# Patient Record
Sex: MALE
[De-identification: names, ages, dates, MRNs, and addresses within clinical notes are randomized per-mention and may not be internally consistent; named-entity substitution may affect disease eponyms.]

---

## 2017-07-23 NOTE — XRAY REPORT
Left knee 3 views:



History: Pain and swelling.



Findings:



Minimal narrowing of the medial compartment and patellofemoral 

compartment knee joint. Sclerotic articular surface with early 

degenerative change. No fracture. No soft tissue calcification. No 

joint effusion.



Impression:



Mild degenerative changes medial and patellofemoral compartment knee 

joint. Incidentally noted deformity of the proximal diaphysis of fibula 

probably related to old injury.

## 2017-07-23 NOTE — EMERGENCY DEPARTMENT REPORT
Entered by PATRICK VIVEROS, acting as scribe for CAITLIN FELDMAN NP.





ED Lower Extremity HPI





- General


Chief Complaint: Extremity Injury, Lower


Stated Complaint: KNEE PAIN


Time Seen by Provider: 17 11:15


Source: patient


Mode of arrival: Ambulatory


Limitations: No Limitations





- History of Present Illness


Initial Comments: 





This is a 58 y/o male, nontoxic, well nourished in appearance, no acute signs 

of distress presents with chronic intermittent left knee pain x 1 week. 

Associated symptoms include swelling but he denies fever, chills, numbness, 

tingling, back pain, joint swelling, chest pain, SOB, cough, calf swelling and 

calf pain. Patient denies long car rides, recent travels, or recent hospital 

stays.  Pain is described as aching, stabbing and 8/10 on a severity scale. 

Patient stated has been walking this past month more then usual.  Denies any 

injury. No alleviating or aggravating factors. DAVID SALAZAR Complaint: other (left knee pain)


Onset/Timin


-: week(s)


Injury: Knee: Left


Place: home


Severity: moderate


Severity scale (0 -10): 8


Improves With: nothing


Worsens With: nothing


Associated Symptoms: ambulatory.  denies: snap/pop sensation, swelling, numbness

, tingling, unable to bear weight, able to partially bear weight





- Related Data


 Previous Rx's











 Medication  Instructions  Recorded  Last Taken  Type


 


Ibuprofen [Motrin 600 MG tab] 600 mg PO Q8H PRN #20 tablet 17 Unknown Rx


 


predniSONE [Deltasone] 20 mg PO BID #10 tab 17 Unknown Rx











 Allergies











Allergy/AdvReac Type Severity Reaction Status Date / Time


 


acetaminophen Allergy  Unknown Verified 16 08:42





[From Darvocet-N]     


 


propoxyphene napsylate Allergy  Unknown Verified 16 08:42





[From Darvocet-N]     














ED Review of Systems


Comment: All other systems reviewed and negative


Constitutional: denies: chills, fever


Eyes: denies: eye pain, eye discharge, vision change


ENT: denies: ear pain, throat pain


Respiratory: denies: cough, shortness of breath, wheezing


Cardiovascular: denies: chest pain, palpitations


Endocrine: no symptoms reported


Gastrointestinal: denies: abdominal pain, nausea, diarrhea


Genitourinary: denies: urgency, dysuria


Musculoskeletal: denies: back pain, joint swelling, myalgia


Skin: denies: rash, lesions


Neurological: denies: numbness


Psychiatric: denies: anxiety, depression


Hematological/Lymphatic: denies: easy bleeding, easy bruising





ED Past Medical Hx





- Past Medical History


Previous Medical History?: Yes


Hx Hypertension: Yes





- Surgical History


Past Surgical History?: No





- Social History


Smoking Status: Current Some Day Smoker


Substance Use Type: None





- Medications


Home Medications: 


 Home Medications











 Medication  Instructions  Recorded  Confirmed  Last Taken  Type


 


Ibuprofen [Motrin 600 MG tab] 600 mg PO Q8H PRN #20 tablet 17  Unknown Rx


 


predniSONE [Deltasone] 20 mg PO BID #10 tab 17  Unknown Rx














ED Physical Exam





- General


Limitations: No Limitations


General appearance: alert, in no apparent distress





- Head


Head exam: Present: atraumatic, normocephalic, normal inspection





- Eye


Eye exam: Present: normal appearance, PERRL, EOMI.  Absent: scleral icterus, 

conjunctival injection, nystagmus, periorbital swelling, periorbital tenderness


Pupils: Present: normal accommodation





- ENT


ENT exam: Present: normal exam, normal orophraynx, mucous membranes moist, TM's 

normal bilaterally, normal external ear exam





- Neck


Neck exam: Present: normal inspection, full ROM.  Absent: tenderness, 

meningismus, lymphadenopathy, thyromegaly





- Respiratory


Respiratory exam: Present: normal lung sounds bilaterally.  Absent: respiratory 

distress, wheezes, rales, rhonchi, stridor, chest wall tenderness, accessory 

muscle use, decreased breath sounds, prolonged expiratory





- Cardiovascular


Cardiovascular Exam: Present: regular rate, normal rhythm, normal heart sounds.

  Absent: bradycardia, tachycardia, irregular rhythm, systolic murmur, 

diastolic murmur, rubs, gallop





- GI/Abdominal


GI/Abdominal exam: Present: soft, normal bowel sounds.  Absent: distended, 

tenderness, guarding, rebound, rigid, diminished bowel sounds





- Rectal


Rectal exam: Present: deferred





- Extremities Exam


Extremities exam: Present: normal inspection, full ROM, normal capillary 

refill.  Absent: tenderness, pedal edema, joint swelling, calf tenderness





- Expanded Lower Extremity Exam


  ** Left


Hip exam: Present: normal inspection, full ROM.  Absent: tenderness, swelling, 

abrasion, laceration, ecchymosis, deformity, crepidus, dislocation, erythema, 

external rotation, internal rotation, shortening


Upper Leg exam: Present: normal inspection, full ROM.  Absent: tenderness, 

swelling, abrasion, laceration, ecchymosis, deformity, crepidus, dislocation, 

erythema


Knee exam: Present: normal inspection, full ROM, full knee extension.  Absent: 

tenderness, swelling, abrasion, laceration, ecchymosis, deformity, crepidus, 

dislocation, erythema, effusion, pain w/ pronation/supination, posterior draw 

sign, pain/laxity with valgus, pain/laxity with varus


Lower Leg exam: Present: normal inspection, full ROM.  Absent: tenderness, 

swelling, abrasion, laceration, ecchymosis, deformity, crepidus, dislocation, 

erythema, palpable cord, Yoselin's sign


Ankle exam: Present: normal inspection, full ROM.  Absent: tenderness, swelling

, abrasion, laceration, ecchymosis, deformity, crepidus, dislocation, erythema, 

anterior draw sign


Foot/Toe exam: Present: normal inspection, full ROM.  Absent: tenderness, 

swelling, abrasion, laceration, ecchymosis, deformity, crepidus, dislocation, 

erythema, amputation, puncture wound, foreign body, calcaneal tenderness, 

tenderness at base of 5th metatarsal, nail avulsion, subungual hematoma


Neuro vascular tendon exam: Present: no vascular compromise.  Absent: pulse 

deficit, abnormal cap refill, motor deficit, sensory deficit, tendon deficit, 

extremity cold to touch, pallor, abnormal 2-point discrimination, decreased fine

/light touch, foot drop, peroneal nerve deficit, significant pain with passive 

ROM of distal joint


Gait: Positive: observed and normal





- Back Exam


Back exam: Present: normal inspection, full ROM.  Absent: tenderness, CVA 

tenderness (R), CVA tenderness (L), muscle spasm, paraspinal tenderness, 

vertebral tenderness, rash noted





- Neurological Exam


Neurological exam: Present: alert, oriented X3, CN II-XII intact, normal gait, 

reflexes normal





- Psychiatric


Psychiatric exam: Present: normal affect, normal mood





- Skin


Skin exam: Present: warm, dry, intact, normal color.  Absent: rash





ED Course


 Vital Signs











  17





  08:36


 


Temperature 98.8 F


 


Pulse Rate 83


 


Respiratory 24





Rate 


 


Blood Pressure 133/79


 


O2 Sat by Pulse 98





Oximetry 














- Reevaluation(s)


Reevaluation #1: 





17 12:11


Patient is able to speak in full sentences with no signs of distress noted,.





ED Lower Extremity MDM





- Medical Decision Making





Ed course: This is a 57-year-old male that presents with chronic intermittent 

knee pain





1- patient was examined by myself.  X-ray of left knee has been obtained and 

dictated by Dr. Daley.  Impression; mild degenerative changes with compartment 

knee joint. Proximal diaphysis of fibula related to old injury. Patient stated 

he is aware of the fibula fracture that occurred about 20 years ago on monkey 

bars. 


2- Patient received a ibuprofen and Ace wrap to left knee


3- patient received ibuprofen as well as prednisone 5 days.


4- patient was instructed to follow up with her primary care doctor/orthopedic 

in 3-5 days or symptoms such as numbness, tingling, joint redness, calf pain, 

joint swelling, chest pain or shortness of breath return to emergency room as 

was possible


5- At time time of discharge, the patient does not seem toxic or ill in 

appearance.  No acute signs of distress noted.  Patient agrees to discharge 

treatment plan of care.  No further questions noted by the patient.


6- pt was instructed to RICE therapy





ED Disposition


Clinical Impression: 


Knee pain


Qualifiers:


 Chronicity: chronic Laterality: left Qualified Code(s): M25.562 - Pain in left 

knee; G89.29 - Other chronic pain





Disposition: DC-01 TO HOME OR SELFCARE


Is pt being admited?: No


Does the pt Need Aspirin: No


Condition: Stable


Instructions:  Arthralgia (ED), Knee Pain (ED), Prednisone (By mouth), 

Ibuprofen (By mouth), RICE Therapy (ED)


Additional Instructions: 


follow up with a primary care doctor/orthopedic in 3-5 days or symptoms such as 

numbness, tingling, joint redness, calf pain, joint swelling, chest pain or 

shortness of breath return to emergency room as was possible


Rest, elevate, ice extremity.


Prescriptions: 


Ibuprofen [Motrin 600 MG tab] 600 mg PO Q8H PRN #20 tablet


 PRN Reason: Pain


predniSONE [Deltasone] 20 mg PO BID #10 tab


Referrals: 


PRIMARY CARE,MD [Primary Care Provider] - 3-5 Days


SJ DUNLAP MD [Staff Physician] - 3-5 Days


[facility] [Outside] - 3-5 Days


[facility] [Outside] - 3-5 Days


Forms:  Work/School Release Form(ED)





This documentation as recorded by the JACKELINE cline ELIZABETH,accurately 

reflects the service I personally performed and the decisions made by me,CAITLIN FELDMAN, NAN.

## 2017-07-30 NOTE — CAT SCAN REPORT
FINAL REPORT



PROCEDURE:  CT ABDOMEN PELVIS WO CON



TECHNIQUE:  Computerized axial tomography of the abdomen and

pelvis was performed without intravenous contrast. This study is

performed without intravascular contrast material and its

sensitivity for abdominal and pelvic pathology, including

neoplasms, inflammation, abscess, free fluid, thrombosis,

arterial dissection and infarction, is reduced compared with a

contrast enhanced study. 



HISTORY:  flank pain 



COMPARISON:  No prior studies are available for comparison.



FINDINGS:  

Visualized lower thorax: No significant abnormality.



Liver: Normal size and attenuation.



Spleen: Normal size and attenuation.



Gallbladder and biliary system: Normal.



Pancreas: Normal.



Adrenals: Normal.



Kidneys: No hydronephrosis or urolithiasis.



GI tract: Appendix is visualized and does not appear inflamed.

Mild to moderate volume of stool is seen throughout the colon,

compatible with constipation. No bowel obstruction or acute

inflammation is seen.



Lymph nodes and mesentery: Normal.



Vasculature: Diffuse atherosclerotic calcification of the

abdominal aorta.



Bladder: Normal.



Reproductive organs: Prostate calcifications are noted.



Peritoneum: No free fluid.



Musculoskeletal structures: Degenerative disc changes of the

thoracolumbar spine.



Other: None.



IMPRESSION:  

Mild to moderate constipation.

## 2017-07-30 NOTE — EMERGENCY DEPARTMENT REPORT
ED Back Pain/Injury HPI





- General


Chief Complaint: Back Pain/Injury


Stated Complaint: BACK PAIN


Time Seen by Provider: 07/30/17 13:11


Source: patient


Limitations: No Limitations





- History of Present Illness


Initial Comments: 





Patient here reports that he is having lower back pain on the left side that 

started this morning.  Patient denies any injury.  Denies any urinary burning 

frequency urgency.  Denies any nausea or vomiting.  Denies any abdominal pain.  

Patient said he has had a history of arthritis and he was here last week and 

they put him on prednisone and ibuprofen.  He said his pain is 7 out of 10 and 

it comes and goes in his lower back.  Patient has a history of high blood 

pressure but he said he doesn't take any blood pressure medication.  He said he 

does not follow with  and he does not have a doctor.  Patient has a history 

of chronic pain in multiple sites from arthritis.  Pain is worst walking and 

better with rest.  Pain feels achy.  Denies any loss of bowel or bladder 

function.  Neither any numbness or tingling to extremity.


MD Complaint: back pain


-: This morning


Similar Symptoms Previously: Yes


Place: home


Radiation: none


Severity: severe


Severity scale (0 -10): 7


Quality: aching


Consistency: intermittent


Improves With: immobilization


Worsens With: walking


Context: other (patient has a history of chronic pain)


Associated Symptoms: denies: confusion, weakness, chest pain, numbness, 

difficulty walking, cough, difficulty urinating, diaphoresis, incontinence, 

fever/chills, constipation, headaches, abdominal pain, loss of appetite, malaise

, nausea/vomiting, rash, seizure, shortness of breath, syncope


Treatments Prior to Arrival: other (none)





- Related Data


 Previous Rx's











 Medication  Instructions  Recorded  Last Taken  Type


 


Ibuprofen [Motrin 600 MG tab] 600 mg PO Q8H PRN #20 tablet 07/23/17 Unknown Rx


 


predniSONE [Deltasone] 20 mg PO BID #10 tab 07/23/17 Unknown Rx











 Allergies











Allergy/AdvReac Type Severity Reaction Status Date / Time


 


acetaminophen Allergy  Unknown Verified 04/25/16 08:42





[From Darvocet-N]     


 


propoxyphene napsylate Allergy  Unknown Verified 04/25/16 08:42





[From Darvocet-N]     














ED Review of Systems


ROS: 


Stated complaint: BACK PAIN


Other details as noted in HPI





Comment: All other systems reviewed and negative


Constitutional: denies: chills, fever


Eyes: denies: vision change


Respiratory: no symptoms reported


Cardiovascular: denies: chest pain, palpitations, edema, syncope


Gastrointestinal: denies: abdominal pain, nausea, vomiting, diarrhea, 

constipation


Genitourinary: denies: urgency, dysuria, frequency, hematuria, discharge


Musculoskeletal: back pain.  denies: joint swelling, arthralgia, myalgia


Skin: denies: rash


Neurological: denies: headache, weakness, numbness, paresthesias, confusion, 

abnormal gait, vertigo





ED Past Medical Hx





- Past Medical History


Previous Medical History?: Yes


Hx Hypertension: Yes


Hx Arthritis: Yes





- Surgical History


Past Surgical History?: No





- Family History


Family history: no significant





- Social History


Smoking Status: Current Some Day Smoker


Substance Use Type: Alcohol


Other Social History: 











- Medications


Home Medications: 


 Home Medications











 Medication  Instructions  Recorded  Confirmed  Last Taken  Type


 


Ibuprofen [Motrin 600 MG tab] 600 mg PO Q8H PRN #20 tablet 07/23/17  Unknown Rx


 


predniSONE [Deltasone] 20 mg PO BID #10 tab 07/23/17  Unknown Rx














ED Physical Exam





- General


Limitations: No Limitations


General appearance: alert, in no apparent distress





- Head


Head exam: Present: atraumatic, normocephalic, normal inspection





- Eye


Eye exam: Present: normal appearance, PERRL, EOMI


Pupils: Present: normal accommodation





- ENT


ENT exam: Present: normal exam, normal orophraynx, mucous membranes moist





- Neck


Neck exam: Present: normal inspection, full ROM.  Absent: tenderness, 

meningismus, lymphadenopathy





- Expanded Neck Exam


  ** Expanded


Neck exam: Absent: tenderness, midline deformity, anterior neck swelling





- Respiratory


Respiratory exam: Present: normal lung sounds bilaterally.  Absent: respiratory 

distress, chest wall tenderness





- Cardiovascular


Cardiovascular Exam: Present: regular rate, normal rhythm, normal heart sounds





- GI/Abdominal


GI/Abdominal exam: Present: soft, normal bowel sounds.  Absent: distended, 

tenderness, guarding, rebound, rigid





- Extremities Exam


Extremities exam: Present: normal inspection, full ROM, normal capillary 

refill.  Absent: tenderness, pedal edema, joint swelling, calf tenderness





- Back Exam


Back exam: Present: normal inspection, full ROM, CVA tenderness (L).  Absent: 

tenderness, CVA tenderness (R), muscle spasm, paraspinal tenderness, vertebral 

tenderness, rash noted





- Expanded Back Exam


  ** Expanded


Back exam: Absent: saddle anesthesia


Back exam: Negative Straight Leg Raising: Left, Right





- Neurological Exam


Neurological exam: Present: alert, oriented X3, normal gait, reflexes normal.  

Absent: motor sensory deficit





- Expanded Neurological Exam


  ** Expanded


Neurological exam: Absent: innattentive, memory loss-remote event, memory loss-

recent event, ataxia, receptive aphasia, expressive aphasia, total aphasia, 

tremor, protecting the airway


Patient oriented to: Present: person, place, time


Speech: Present: fluid speech


Cranial nerves: EOM's Intact: Normal, Gag Reflex: Normal, Tongue Deviation: 

Normal, Nystagmus: Normal, Facial Sensation: Normal


Cerebellar function: Romberg: Normal


Upper motor neuron: Pronator Drift: Normal, Sensory Extinction: Normal


Sensory exam: Upper Extremity Light Touch: Normal, Upper Extremity Pin Prick: 

Normal, Upper Extremity Temperature: Normal, UE 2 Point Discrimination: Normal, 

Lower Extremity Light Touch: Normal, Lower Extremity Pin Prick: Normal, Lower 

Extremity Temperature: Normal, LE 2 Point Discrimination: Normal


Motor strength exam: RUE: 5, LUE: 5, RLE: 5, LLE: 5


DTR: bicep (R): 2+, bicep (L): 2+, tricep (R): 2+, tricep (L): 2+, knee (R): 2+

, knee (L): 2+, ankle (R): 2+, ankle (L): 2+


Best Eye Response (Krum): (4) open spontaneously


Best Motor Response (Jez): (6) obeys commands


Best Verbal Response (Jez): (5) oriented


Krum Total: 15





- Psychiatric


Psychiatric exam: Present: normal affect, normal mood





- Skin


Skin exam: Present: warm, dry, intact, normal color.  Absent: rash





ED Course


 Vital Signs











  07/30/17 07/30/17 07/30/17





  11:25 13:50 14:00


 


Temperature 97.6 F  


 


Pulse Rate 57 L 63 90


 


Respiratory 16 18 





Rate   


 


Blood Pressure 181/108  175/80


 


Blood Pressure  164/102 





[Left]   


 


O2 Sat by Pulse 98 95 





Oximetry   














  07/30/17





  15:51


 


Temperature 


 


Pulse Rate 


 


Respiratory 





Rate 


 


Blood Pressure 


 


Blood Pressure 159/98





[Left] 


 


O2 Sat by Pulse 





Oximetry 














- Reevaluation(s)


Reevaluation #1: 





07/30/17  14:26


 given Toradol 30 mg IM, Deltasone 60 mg by mouth and clonidine 0.1 mg by mouth 

and emergency room.





We will recheck pressure.  Urinalysis pending.





Reevaluation #2: 





07/30/17 17:30


Patient with positive bacteria in urine at 1+ and positive calcium oxalate.  

Negative leuks, negative white blood cell.  Negative blood.  Patient with 

positive CVA tenderness.  CT of abdomen and pelvis was done and shows no 

hydronephrosis or kidney stones.  Patient will mild to moderate constipation.


07/30/17 19:32








ED Medical Decision Making





- Lab Data








 Lab Results











  07/30/17 Range/Units





  13:57 


 


Urine Color  Yellow  (Yellow)  


 


Urine Turbidity  Clear  (Clear)  


 


Urine pH  5.0  (5.0-7.0)  


 


Ur Specific Gravity  1.031 H  (1.003-1.030)  


 


Urine Protein  <15 mg/dl  (Negative)  mg/dL


 


Urine Glucose (UA)  Neg  (Negative)  mg/dL


 


Urine Ketones  Neg  (Negative)  mg/dL


 


Urine Blood  Neg  (Negative)  


 


Urine Nitrite  Neg  (Negative)  


 


Urine Bilirubin  Neg  (Negative)  


 


Urine Urobilinogen  < 2.0  (<2.0)  mg/dL


 


Ur Leukocyte Esterase  Neg  (Negative)  


 


Urine WBC (Auto)  1.0  (0.0-6.0)  /HPF


 


Urine RBC (Auto)  9.0  (0.0-6.0)  /HPF


 


U Epithel Cells (Auto)  < 1.0  (0-13.0)  /HPF


 


Urine Bacteria (Auto)  1+  (Negative)  /HPF


 


Calcium Oxalate Crystal  Few  


 


Urine Mucus  Few  /HPF








Urine culture pending





- Radiology Data


Radiology results: report reviewed





CT abdomen and pelvis shows mild to moderate constipation.  Kidney stones or 

hydronephrosis





- Medical Decision Making


MDM:





ED course: She presented to the emergency room with left lower back pain that 

started this morning in.  He's had similar episode of back pain in the past and 

was here on 7/23 2017 and treated per patient for arthritis with Motrin and 

prednisone.  Patient did not have any injury but he did have CVA tenderness 

therefore urinalysis done which was negative for hematuria, positive for 

bacteria, positive for calcium oxalate, no white blood cell and no leukocyte 

Estrace.  Patient was given Toradol and Deltasone emergency room to manage 

pain.  He was also given clonidine for elevated blood pressure with history of 

high blood pressure.  Pressure had stabilized.  Patient reports that this pain 

was down to 3 out of 10.





Diagnostics/lab: Scan of the abdomen and pelvis without contrast revealed 

constipation and no kidney stones or hydronephrosis.


Urinalysis revealed 1+ bacteria without any white blood cells or hematuria.  

Patient did have calcium oxalate in urine.  Urine culture is pending.





Diagnosis: Constipation, left lower back pain, elevated blood pressure reading 

with history of hypertension





MEDS: Given Toradol 30 mg IM and Deltasone 60 mg by mouth for back pain which 

is PAIN IS  reduced to 3 out of 10.  He was given clonidine 0.1 mg by mouth for 

elevated blood pressure and his blood pressure is better.





I went in room to give patient his CT scan results and discharge diagnoses with 

treatment plan and patient was not in his room.  I was told by nurse that 

patient walked out if emergency room.  Patient left after being seen by 

provider.  I tried to call patient via phone to allow him to return to get his 

discharge instructions and paperwork and phone was just ringing without any 

OPPORTUNITY leave message.


Critical care attestation.: 


If time is entered above; I have spent that time in minutes in the direct care 

of this critically ill patient, excluding procedure time.








ED Disposition


Clinical Impression: 


 Acute exacerbation of chronic low back pain, Elevated systolic blood pressure 

reading with diagnosis of hypertension





Constipation


Qualifiers:


 Constipation type: unspecified constipation type Qualified Code(s): K59.00 - 

Constipation, unspecified





Disposition: Z-07 ELOPED


Is pt being admited?: No


Does the pt Need Aspirin: No


Condition: Stable


Instructions:  Back Pain (ED), Constipation (ED), Hypertension (ED)

## 2017-09-04 NOTE — CAT SCAN REPORT
CT abdomen and pelvis with contrast:



Left upper quadrant pain.



Transverse images are obtained from lower chest to the ischium 

following IV contrast administration. Coronal and sagittal 2-D 

reformatted images were.



The visualized lung bases are unremarkable. The abdominal and 

retroperitoneal organs are normal. The abdominal aorta and iliac 

vessels contain significant mural calcification but has a normal sizes 

and contours. There is mild dilatation of the distal abdominal aorta 

with a maximum diameter of 2.7 cm. The unopacified bowel and mesentery 

appear normal. The appendix is visualized. No abnormal fluid 

accumulation and no free air is noted. No obvious adenopathy. Sections 

through the pelvis demonstrate calcification of a normal sized prostate 

gland. Mild posterior spondylosis identified from L2-3 through L4-5 

levels.



Impressions:



Degenerative abdominal aortic changes. Otherwise, unremarkable exam for 

age.

## 2017-09-04 NOTE — EMERGENCY DEPARTMENT REPORT
Chief Complaint: Abdominal Pain


Stated Complaint: ABD PAIN


Time Seen by Provider: 09/04/17 10:00





- HPI


History of Present Illness: 





PT c/o LUQ abd pain since yesterday.





Pt states he drank a few beers last night. 





- ROS


Review of Systems: 





- n/v/d 





- Exam


Physical Exam: 





abd soft, luq ttp 


MSE screening note: 


Focused history and physical exam performed.


Due to findings the following was ordered:





labs 





ED Disposition for MSE


Condition: Stable

## 2017-09-04 NOTE — EMERGENCY DEPARTMENT REPORT
ED Abdominal Pain HPI





- General


Chief Complaint: Abdominal Pain


Stated Complaint: ABD PAIN


Time Seen by Provider: 09/04/17 10:00


Source: patient


Mode of arrival: Ambulatory


Limitations: No Limitations





- History of Present Illness


Initial Comments: 


Patient complains of left upper quadrant abdominal pain since yesterday.  He 

states he drank 2 beers yesterday.  He states he's been to another hospital 

within the last year similar complaints.  He did not have any imaging studies.  

He states that he thinks that we have already done more lab tests and they did 

over there.  He did not follow-up on his condition which apparently has been 

somewhat recurrent.  He denies nonsteroidals.  He denies any signs of GI 

bleeding.  He denies nausea vomiting or diarrhea.  He said no fever or chills.





MD Complaint: abdominal pain


-: Gradual, month(s)


Location: LUQ


Radiation: none


Migration to: no migration


Severity: moderate


Quality: aching


Consistency: intermittent


Improves With: nothing


Worsens With: nothing


Context: other (EtOH)


Associated Symptoms: denies other symptoms





- Related Data


 Previous Rx's











 Medication  Instructions  Recorded  Last Taken  Type


 


Ibuprofen [Motrin 600 MG tab] 600 mg PO Q8H PRN #20 tablet 07/23/17 Unknown Rx


 


predniSONE [Deltasone] 20 mg PO BID #10 tab 07/23/17 Unknown Rx


 


Lansoprazole [Prevacid] 15 mg PO BID #30 cap 09/04/17 Unknown Rx


 


traMADol [Ultram] 50 mg PO Q6HR PRN #14 tablet 09/04/17 Unknown Rx











 Allergies











Allergy/AdvReac Type Severity Reaction Status Date / Time


 


acetaminophen Allergy  Seizure Verified 09/04/17 09:56





[From Darvocet-N]     


 


cyclobenzaprine HCl Allergy  Itching Verified 09/04/17 09:56





[From Flexeril]     


 


propoxyphene napsylate Allergy  Seizure Verified 09/04/17 09:56





[From Darvocet-N]     














ED Review of Systems


ROS: 


Stated complaint: ABD PAIN


Other details as noted in HPI





Constitutional: denies: chills, fever


Eyes: denies: eye pain, eye discharge, vision change


ENT: denies: ear pain, throat pain


Respiratory: denies: cough, shortness of breath, wheezing


Cardiovascular: denies: chest pain, palpitations


Endocrine: no symptoms reported


Gastrointestinal: as per HPI, abdominal pain.  denies: nausea, vomiting, 

diarrhea, hematemesis, melena, hematochezia


Genitourinary: denies: urgency, dysuria


Musculoskeletal: denies: back pain, joint swelling, arthralgia


Skin: denies: rash, lesions


Neurological: denies: headache, weakness, paresthesias


Psychiatric: denies: anxiety, depression


Hematological/Lymphatic: denies: easy bleeding, easy bruising





ED Past Medical Hx





- Past Medical History


Hx Hypertension: Yes (NONCOMPLIANT W/ MEDS)


Hx Arthritis: Yes (KNEES)


Additional medical history: CHRONIC BACK PAIN





- Surgical History


Past Surgical History?: No





- Social History


Smoking Status: Current Every Day Smoker


Substance Use Type: Alcohol





- Medications


Home Medications: 


 Home Medications











 Medication  Instructions  Recorded  Confirmed  Last Taken  Type


 


Ibuprofen [Motrin 600 MG tab] 600 mg PO Q8H PRN #20 tablet 07/23/17  Unknown Rx


 


predniSONE [Deltasone] 20 mg PO BID #10 tab 07/23/17  Unknown Rx


 


Lansoprazole [Prevacid] 15 mg PO BID #30 cap 09/04/17  Unknown Rx


 


traMADol [Ultram] 50 mg PO Q6HR PRN #14 tablet 09/04/17  Unknown Rx














ED Physical Exam





- General


Limitations: No Limitations


General appearance: alert, in no apparent distress





- Head


Head exam: Present: atraumatic, normocephalic





- Eye


Eye exam: Present: normal appearance, PERRL, EOMI.  Absent: scleral icterus





- ENT


ENT exam: Present: mucous membranes moist





- Neck


Neck exam: Present: normal inspection





- Respiratory


Respiratory exam: Present: normal lung sounds bilaterally.  Absent: respiratory 

distress





- Cardiovascular


Cardiovascular Exam: Present: regular rate, normal rhythm.  Absent: systolic 

murmur, diastolic murmur, rubs, gallop





- GI/Abdominal


GI/Abdominal exam: Present: soft, tenderness (very mild left upper quadrant 

discomfort to palpation without peritoneal signs), normal bowel sounds.  Absent

: distended, guarding, rebound, rigid, organomegaly, mass, bruit, pulsatile mass

, hernia





- Rectal


Rectal exam: Present: deferred





- Extremities Exam


Extremities exam: Present: normal inspection





- Back Exam


Back exam: Present: normal inspection





- Neurological Exam


Neurological exam: Present: alert, oriented X3





- Psychiatric


Psychiatric exam: Present: normal affect, normal mood





- Skin


Skin exam: Present: warm, dry, intact, normal color.  Absent: rash





ED Course


 Vital Signs











  09/04/17 09/04/17 09/04/17





  09:59 12:31 14:41


 


Temperature 97.8 F 97.7 F 


 


Pulse Rate 62 52 L 45 L


 


Respiratory 17 14 17





Rate   


 


Blood Pressure 196/98  


 


Blood Pressure  183/88 172/94





[Left]   


 


O2 Sat by Pulse 99 96 97





Oximetry   














- Reevaluation(s)


Reevaluation #1: 


Patient's heart rate ranged from the high 40s to low 70s.  He had no symptoms 

associated with bradycardia.  He remained stable in the emergency department 

otherwise hemodynamically.  He is appropriate for outpatient management.  I 

think gastritis is a likely possibility.  He'll be instructed to avoid gastric 

irritants.


09/04/17 14:55





Reevaluation #2: 


Patient's repeat blood pressure was 160/87.  He was directed to recheck with 

the [facility].


09/04/17 15:22








ED Medical Decision Making





- Lab Data


Result diagrams: 


 09/04/17 10:04





 09/04/17 10:04








 Laboratory Results - last 24 hr











  09/04/17 09/04/17 09/04/17





  10:04 10:04 10:47


 


WBC  8.8  


 


RBC  5.32 H  


 


Hgb  15.9 H  


 


Hct  47.7 H  


 


MCV  90  


 


MCH  30  


 


MCHC  33  


 


RDW  13.5  


 


Plt Count  220  


 


Lymph % (Auto)  29.6  


 


Mono % (Auto)  7.5 H  


 


Eos % (Auto)  4.6 H  


 


Baso % (Auto)  0.8  


 


Lymph #  2.6  


 


Mono #  0.7  


 


Eos #  0.4  


 


Baso #  0.1  


 


Seg Neutrophils %  57.5  


 


Seg Neutrophils #  5.0  


 


Sodium   141 


 


Potassium   4.5 


 


Chloride   103.9 


 


Carbon Dioxide   25 


 


Anion Gap   17 


 


BUN   14 


 


Creatinine   0.8 


 


Estimated GFR   > 60 


 


BUN/Creatinine Ratio   17.50 


 


Glucose   116 H 


 


Calcium   8.5 


 


Total Bilirubin   0.40 


 


AST   26 


 


ALT   30 


 


Alkaline Phosphatase   75 


 


Total Protein   7.1 


 


Albumin   4.3 


 


Albumin/Globulin Ratio   1.5 


 


Lipase   105 H 


 


Urine Color    Yellow


 


Urine Turbidity    Clear


 


Urine pH    5.0


 


Ur Specific Gravity    1.020


 


Urine Protein    <15 mg/dl


 


Urine Glucose (UA)    Neg


 


Urine Ketones    Neg


 


Urine Blood    Sm


 


Urine Nitrite    Neg


 


Urine Bilirubin    Neg


 


Urine Urobilinogen    2.0


 


Ur Leukocyte Esterase    Neg


 


Urine WBC (Auto)    < 1.0


 


Urine RBC (Auto)    3.0


 


Urine Mucus    Few














- EKG Data


-: EKG Interpreted by Me


EKG shows normal: sinus rhythm, axis, intervals, QRS complexes, ST-T waves


Rate: normal





- EKG Data


Interpretation: no acute changes





- Radiology Data


Radiology results: report reviewed


interpreted by me: 


CT showed no acute process





Critical care attestation.: 


If time is entered above; I have spent that time in minutes in the direct care 

of this critically ill patient, excluding procedure time.








ED Disposition


Clinical Impression: 


Abdominal pain


Qualifiers:


 Abdominal location: left upper quadrant Qualified Code(s): R10.12 - Left upper 

quadrant pain





Gastritis


Qualifiers:


 Gastritis type: other gastritis Chronicity: unspecified Gastritis bleeding: 

without bleeding Qualified Code(s): K29.60 - Other gastritis without bleeding





Disposition: DC-01 TO HOME OR SELFCARE


Is pt being admited?: No


Does the pt Need Aspirin: No


Condition: Stable


Instructions:  Gastritis (ED), Abdominal Pain (ED)


Additional Instructions: 


Stomach irritants like alcohol aspirin Advil Aleve.  Follow-up with the primary 

care physician.  Over-the-counter Pepcid if you cannot afford the prescription 

medication.  Return any acute change or worsening.


Prescriptions: 


Lansoprazole [Prevacid] 15 mg PO BID #30 cap


traMADol [Ultram] 50 mg PO Q6HR PRN #14 tablet


 PRN Reason: Pain


Referrals: 


[facility] [Provider Group] - 3-5 Days


PRIMARY CARE,MD [Primary Care Provider] - 3-5 Days


Time of Disposition: 14:56

## 2018-02-09 NOTE — EMERGENCY DEPARTMENT REPORT
ED Abdominal Pain HPI





- General


Chief Complaint: Abdominal Pain


Stated Complaint: ABD PAIN


Time Seen by Provider: 02/09/18 14:25


Source: patient


Mode of arrival: Ambulatory


Limitations: No Limitations





- History of Present Illness


Initial Comments: 





This is a 57 y.o. male presents with LUQ pain for 5 months. Reports being told 

to f/u with primary care provider or cardiologist but he didn't have money or a 

job to follow up. He felt okay for a couple months but now the pain is worse. 

Reports pain feeling like something is squeezing and worse with elevation of 

left arm. States it feel like a hard rock in left upper quadrant when pain 

occurs. Pain is non-radiating. History of low back pain with sciatica, 

neuropathy, GERD, & HTN. He is currently off blood pressure medication because 

he could not afford to see a doctor for refills. He was on lisinopril. Denies 

chest pain or radiating pain, nausea/vomiting, fever, or congestion.


MD Complaint: abdominal pain (LUQ)


-: month(s) (5)


Location: LUQ


Radiation: none


Migration to: no migration


Severity: severe


Severity scale (0 -10): 10


Quality: stabbing, sharp


Consistency: intermittent


Improves With: nothing


Worsens With: movement, other (touch)


Associated Symptoms: denies other symptoms.  denies: nausea, vomiting, diarrhea

, fever, constipation, melena, hematuria, anorexia, syncope





- Related Data


 Previous Rx's











 Medication  Instructions  Recorded  Last Taken  Type


 


Ibuprofen [Motrin 600 MG tab] 600 mg PO Q8H PRN #20 tablet 07/23/17 Unknown Rx


 


predniSONE [Deltasone] 20 mg PO BID #10 tab 07/23/17 Unknown Rx


 


Lansoprazole [Prevacid] 15 mg PO BID #30 cap 09/04/17 Unknown Rx


 


traMADol [Ultram] 50 mg PO Q6HR PRN #14 tablet 09/04/17 Unknown Rx


 


Ondansetron [Zofran TAB] 4 mg PO Q8HR PRN #20 tablet 02/09/18 Unknown Rx


 


traMADol [Ultram] 50 mg PO Q6HR PRN #20 tablet 02/09/18 Unknown Rx











 Allergies











Allergy/AdvReac Type Severity Reaction Status Date / Time


 


acetaminophen Allergy  Seizure Verified 09/04/17 09:56





[From Darvocet-N]     


 


cyclobenzaprine HCl Allergy  Itching Verified 09/04/17 09:56





[From Flexeril]     


 


propoxyphene napsylate Allergy  Seizure Verified 09/04/17 09:56





[From Darvocet-N]     














ED Review of Systems


ROS: 


Stated complaint: ABD PAIN


Other details as noted in HPI





Constitutional: denies: chills, fever


Respiratory: denies: cough, shortness of breath, wheezing


Cardiovascular: denies: chest pain, palpitations


Gastrointestinal: abdominal pain (LUQ).  denies: nausea, vomiting, diarrhea, 

constipation


Musculoskeletal: denies: back pain, joint swelling, arthralgia


Neurological: denies: headache, weakness, paresthesias





ED Past Medical Hx





- Past Medical History


Previous Medical History?: Yes


Hx Hypertension: Yes (NONCOMPLIANT W/ MEDS)


Hx Arthritis: Yes (KNEES)


Additional medical history: CHRONIC BACK PAIN





- Surgical History


Past Surgical History?: No





- Social History


Smoking Status: Never Smoker





- Medications


Home Medications: 


 Home Medications











 Medication  Instructions  Recorded  Confirmed  Last Taken  Type


 


Ibuprofen [Motrin 600 MG tab] 600 mg PO Q8H PRN #20 tablet 07/23/17  Unknown Rx


 


predniSONE [Deltasone] 20 mg PO BID #10 tab 07/23/17  Unknown Rx


 


Lansoprazole [Prevacid] 15 mg PO BID #30 cap 09/04/17  Unknown Rx


 


traMADol [Ultram] 50 mg PO Q6HR PRN #14 tablet 09/04/17  Unknown Rx


 


Ondansetron [Zofran TAB] 4 mg PO Q8HR PRN #20 tablet 02/09/18  Unknown Rx


 


traMADol [Ultram] 50 mg PO Q6HR PRN #20 tablet 02/09/18  Unknown Rx














ED Physical Exam





- General


Limitations: No Limitations





- ENT


ENT exam: Present: mucous membranes moist, other (clear discharge, TM's swollen 

and red)





- Respiratory


Respiratory exam: Present: normal lung sounds bilaterally.  Absent: respiratory 

distress





- Cardiovascular


Cardiovascular Exam: Present: regular rate, normal rhythm.  Absent: systolic 

murmur, diastolic murmur, rubs, gallop





- GI/Abdominal


GI/Abdominal exam: Present: soft, tenderness (generalized tenderness), normal 

bowel sounds.  Absent: guarding, rebound, rigid, organomegaly, mass





- Neurological Exam


Neurological exam: Present: alert, oriented X3, normal gait





- Skin


Skin exam: Present: warm, dry, intact, normal color.  Absent: rash





ED Course


 Vital Signs











  02/09/18 02/09/18 02/09/18





  11:34 15:10 19:42


 


Temperature 98.3 F  97.9 F


 


Pulse Rate 74  57 L


 


Respiratory 16 20 18





Rate   


 


Blood Pressure 161/103  


 


Blood Pressure   147/87





[Left]   


 


O2 Sat by Pulse 94  96





Oximetry   














ED Medical Decision Making





- Lab Data


Result diagrams: 


 02/09/18 15:09





 02/09/18 15:09





- Radiology Data


Radiology results: image reviewed





IMPRESSION: 


Prominence of the common bile duct. If there is concern for 


biliary obstructive pathology followup MRCP could be obtained 





Otherwise negative study 





- Medical Decision Making





57 y.o. male that presents with LUQ pain for 5 months. Patient examined by me. 

Slightly distressed. CT of abd obtained and read by radiologist. Need followup 

with MRCP prominence common bile duct. Referral to GI. Ordered UA, BMP, CBC, 

hepatic panel, lipase, amylase. Received morphine 4 mg IV twice and zofran in 

ER. Blood pressure elevated. Given hydralazine 20 mg IV once. Restart 

lisinopril 10 mg po daily. Follow up with [facility]. Discharged 

home. Discussed plan with patient and agreed with plan. 





Critical care attestation.: 


If time is entered above; I have spent that time in minutes in the direct care 

of this critically ill patient, excluding procedure time.








ED Disposition


Clinical Impression: 


Hypertension


Qualifiers:


 Hypertension type: essential hypertension Qualified Code(s): I10 - Essential (

primary) hypertension





Abdominal pain


Qualifiers:


 Abdominal location: generalized Qualified Code(s): R10.84 - Generalized 

abdominal pain





Disposition: DC-01 TO HOME OR SELFCARE


Is pt being admited?: No


Does the pt Need Aspirin: No


Condition: Stable


Instructions:  Abdominal Pain (ED), Hypertension (ED)


Additional Instructions: 


Follow up with Gastroenterology in 24-72 hours.





If you do not have insurance f/u with [facility] or [facility].








Prescriptions: 


Ondansetron [Zofran TAB] 4 mg PO Q8HR PRN #20 tablet


 PRN Reason: Nausea


traMADol [Ultram] 50 mg PO Q6HR PRN #20 tablet


 PRN Reason: Pain


Referrals: 


[facility] [Provider Group] - 3-5 Days


[facility] [Outside] - 3-5 Days


[facility] [Outside] - 3-5 Days


Time of Disposition: 19:22


Print Language: ENGLISH

## 2018-02-09 NOTE — XRAY REPORT
FINAL REPORT



EXAM:  XR CHEST 1V AP



HISTORY:  Abdominal Pain, WHEEZING,SOB. 



TECHNIQUE:  upright single view chest



PRIORS:  None.



FINDINGS:  

Cardiac and mediastinal contours are unremarkable.  No focal

pulmonary infiltrate is identified.  No pleural fluid collection

seen. Pulmonary vasculature is unremarkable. 



IMPRESSION:  

Negative single-view chest

## 2018-02-09 NOTE — CAT SCAN REPORT
FINAL REPORT



EXAM:  CT ABDOMEN PELVIS W CON



HISTORY:  Abdominal Pain 



TECHNIQUE:  CT abdomen and pelvis with  intravenous contrast 



PRIORS:  None.



FINDINGS:  

No acute abnormality identified in the lung bases. 



No focal abnormality identified within the liver parenchyma.

There prominence of the common bile duct measuring 0.9

centimeters.. The spleen demonstrates normal size and

attenuation. 



No pancreatic abnormalities seen. 



The kidneys demonstrate symmetric contrast enhancement.  No

evidence of hydronephrosis. 



The adrenal glands are unremarkable 



Abdominal aorta is normal in caliber. 



No pathologically enlarged lymph nodes are identified. No signs

of free fluid or free air 



No evidence of small bowel dilatation. 



Colon is nondistended. No pericolonic inflammatory change.



Urinary bladder is unremarkable. 



IMPRESSION:  

Prominence of the common bile duct. If there is concern for

biliary obstructive pathology followup MRCP could be obtained 



Otherwise negative study

## 2018-02-09 NOTE — EMERGENCY DEPARTMENT REPORT
Chief Complaint: Abdominal Pain


Stated Complaint: ABD PAIN


Time Seen by Provider: 02/09/18 14:25





- HPI


History of Present Illness: 





reports that he has had LUQ pain, for 5 months, says it "hurts real bad." 

Squeezing sensation in the LUQ. denies nvd. PMHx: Sciatica, neuropathy, back 

pain, deviated septum, GERD, HTN but doesn't take medicine for it. States he can

't afford to go to a doctor. Used to take lisinopril. I reviewed his previous 

Ct and it does show a abdominal aortic dilatation of 2.7cm in 9/2017





- ROS


Review of Systems: 





Otherwise negative








- Exam


Vital Signs: 


 Vital Signs











  02/09/18





  11:34


 


Temperature 98.3 F


 


Pulse Rate 74


 


Respiratory 16





Rate 


 


Blood Pressure 161/103


 


O2 Sat by Pulse 94





Oximetry 











Physical Exam: 





mild distress, does have wheezing, generalized tenderness to the abdomen, no 

rebound, no guarding. 


MSE screening note: 


Focused history and physical exam performed.


Due to findings the following was ordered:





He will need a work up today. His O2 sats are 94%, he is wheezing, and has abd 

pain that seems significant. 





ED Disposition for MSE


Condition: Stable


Referrals: 


PRIMARY CARE,MD [Primary Care Provider] - 3-5 Days

## 2018-05-27 NOTE — EMERGENCY DEPARTMENT REPORT
ED Lower Extremity HPI





- General


Chief Complaint: Extremity Injury, Lower


Stated Complaint: KNEE PAIN


Time Seen by Provider: 05/27/18 08:00


Source: patient


Mode of arrival: Ambulatory


Limitations: No Limitations





- History of Present Illness


Initial Comments: 





This is a 58-year-old male nontoxic, well nourished in appearance, no acute 

signs of distress presents to the ED with c/o of acute on chronic bilateral 

knee pain.  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 

in pain.  Patient deneis any trauma to the knees. Patient denies any joint 

redness, joint swelling, fever, chills, nausea, vomiting, chest pain or 

shortness breath.  Patient denies abnormal or decreased gait.  Patient stated 

allergies to acetaminophen, aspirin, Flexeril, and Propxyphine.  Patient stated 

does take OTC Motrin with no allergies. 


MD Complaint: knee injury


-: week(s) (1)


Injury: Knee: Right, Left


Place: work


Severity: mild


Severity scale (0 -10): 8


Improves With: immobilization


Worsens With: movement, palpation


Associated Symptoms: ambulatory.  denies: snap/pop sensation, swelling, numbness

, tingling, unable to bear weight, able to partially bear weight





- Related Data


 Previous Rx's











 Medication  Instructions  Recorded  Last Taken  Type


 


Ibuprofen [Motrin 600 MG tab] 600 mg PO Q8H PRN #20 tablet 07/23/17 Unknown Rx


 


predniSONE [Deltasone] 20 mg PO BID #10 tab 07/23/17 Unknown Rx


 


Lansoprazole [Prevacid] 15 mg PO BID #30 cap 09/04/17 Unknown Rx


 


traMADol [Ultram] 50 mg PO Q6HR PRN #14 tablet 09/04/17 Unknown Rx


 


Ondansetron [Zofran TAB] 4 mg PO Q8HR PRN #20 tablet 02/09/18 Unknown Rx


 


traMADol [Ultram] 50 mg PO Q6HR PRN #20 tablet 02/09/18 Unknown Rx


 


Ibuprofen [Motrin] 600 mg PO Q8H PRN #30 tablet 05/27/18 Unknown Rx











 Allergies











Allergy/AdvReac Type Severity Reaction Status Date / Time


 


acetaminophen Allergy  Seizure Verified 09/04/17 09:56





[From Darvocet-N]     


 


aspirin Allergy  Itching Verified 05/27/18 07:47


 


cyclobenzaprine HCl Allergy  Itching Verified 09/04/17 09:56





[From Flexeril]     


 


propoxyphene napsylate Allergy  Seizure Verified 09/04/17 09:56





[From Darvocet-N]     














ED Review of Systems


ROS: 


Stated complaint: KNEE PAIN


Other details as noted in HPI





Constitutional: denies: chills, fever


Eyes: denies: eye pain, eye discharge, vision change


ENT: denies: ear pain, throat pain


Respiratory: denies: cough, shortness of breath, wheezing


Cardiovascular: denies: chest pain, palpitations


Endocrine: no symptoms reported


Gastrointestinal: denies: abdominal pain, nausea, diarrhea


Genitourinary: denies: urgency, dysuria


Musculoskeletal: arthralgia.  denies: back pain, joint swelling


Skin: denies: rash, lesions


Neurological: denies: headache, weakness, paresthesias


Psychiatric: denies: anxiety, depression


Hematological/Lymphatic: denies: easy bleeding, easy bruising





ED Past Medical Hx





- Past Medical History


Hx Hypertension: Yes (NONCOMPLIANT W/ MEDS)


Hx Arthritis: Yes (KNEES)


Hx COPD: Yes


Additional medical history: CHRONIC BACK PAIN, Abdominal aneurysm





- Social History


Smoking Status: Never Smoker


Substance Use Type: Alcohol





- Medications


Home Medications: 


 Home Medications











 Medication  Instructions  Recorded  Confirmed  Last Taken  Type


 


Ibuprofen [Motrin 600 MG tab] 600 mg PO Q8H PRN #20 tablet 07/23/17  Unknown Rx


 


predniSONE [Deltasone] 20 mg PO BID #10 tab 07/23/17  Unknown Rx


 


Lansoprazole [Prevacid] 15 mg PO BID #30 cap 09/04/17  Unknown Rx


 


traMADol [Ultram] 50 mg PO Q6HR PRN #14 tablet 09/04/17  Unknown Rx


 


Ondansetron [Zofran TAB] 4 mg PO Q8HR PRN #20 tablet 02/09/18  Unknown Rx


 


traMADol [Ultram] 50 mg PO Q6HR PRN #20 tablet 02/09/18  Unknown Rx


 


Ibuprofen [Motrin] 600 mg PO Q8H PRN #30 tablet 05/27/18  Unknown Rx














ED Physical Exam





- General


Limitations: No Limitations


General appearance: alert, in no apparent distress





- Head


Head exam: Present: atraumatic, normocephalic





- Eye


Eye exam: Present: normal appearance


Pupils: Present: normal accommodation





- ENT


ENT exam: Present: normal exam, mucous membranes moist





- Neck


Neck exam: Present: normal inspection, full ROM.  Absent: tenderness, 

meningismus, lymphadenopathy





- Respiratory


Respiratory exam: Present: normal lung sounds bilaterally.  Absent: respiratory 

distress, wheezes, rales, rhonchi, stridor





- Cardiovascular


Cardiovascular Exam: Present: regular rate, normal rhythm, normal heart sounds.

  Absent: irregular rhythm, systolic murmur, diastolic murmur, rubs, gallop





- GI/Abdominal


GI/Abdominal exam: Present: soft, normal bowel sounds





- Rectal


Rectal exam: Present: deferred





- Extremities Exam


Extremities exam: Present: normal inspection, full ROM, tenderness, normal 

capillary refill.  Absent: pedal edema, joint swelling, calf tenderness





- Expanded Lower Extremity Exam


  ** Left


Hip exam: Present: normal inspection (bilateral exam), full ROM


Upper Leg exam: Present: normal inspection (bilateral exam), full ROM


Knee exam: Present: normal inspection (bilateral exam), full ROM, tenderness, 

full knee extension.  Absent: swelling, laceration, ecchymosis, deformity, 

crepidus, dislocation, erythema, pain w/ pronation/supination, posterior draw 

sign, pain/laxity with valgus, pain/laxity with varus


Lower Leg exam: Present: normal inspection (bilateral exam), full ROM.  Absent: 

tenderness, swelling, abrasion, laceration, ecchymosis, deformity, crepidus, 

dislocation, erythema, palpable cord, Yoselin's sign


Ankle exam: Present: normal inspection (bilateral exam), full ROM.  Absent: 

tenderness, swelling


Foot/Toe exam: Present: normal inspection (bilateral exam), full ROM


Neuro vascular tendon exam: Present: no vascular compromise (bilateral exam).  

Absent: pulse deficit, abnormal cap refill, motor deficit, sensory deficit, 

tendon deficit, extremity cold to touch, pallor, abnormal 2-point discrimination

, decreased fine/light touch, foot drop, peroneal nerve deficit, significant 

pain with passive ROM of distal joint


Gait: Positive: observed and normal (bilateral exam)





- Back Exam


Back exam: Present: normal inspection, full ROM





- Neurological Exam


Neurological exam: Present: alert, oriented X3, normal gait





- Psychiatric


Psychiatric exam: Present: normal affect, normal mood





- Skin


Skin exam: Present: warm, dry, intact, normal color.  Absent: rash





ED Course





 Vital Signs











  05/27/18





  07:47


 


Temperature 98.0 F


 


Pulse Rate 60


 


Respiratory 18





Rate 


 


Blood Pressure 137/91


 


O2 Sat by Pulse 98





Oximetry 














- Reevaluation(s)


Reevaluation #1: 





05/27/18 08:25


Patient is speaking in full sentences with no signs of distress noted.





ED Lower Extremity MDM





- Medical Decision Making





This is a 58-year-old male that presents with bilateral knee strain.  Patient 

is stable and was examined by me.  I referred patient to an orthopedic doctor 

for further evaluation for possible MRI.  X-ray has been obtained and dictated 

by the radiologist.  Patient is notified of the x-ray report with noted by the 

patient.  Patient does have normal gait with no tenderness and no joint 

swelling.  No ecchymosis. no joint redness or swelling. Not warm to touch. No 

signs of cellulites present.  Patient was instructed to RICE therapy.  Patient 

received Motrin for pain which he stated to me that he does not have any 

allergies to Motrin and has been taking 800 mg.  Patient is discharged with 

Motrin. At time of discharge, the patient does not seem toxic or ill in 

appearance.  No acute signs of distress noted.  Patient agrees to discharge 

treatment plan of care.  No further questions noted by the patient.


Critical care attestation.: 


If time is entered above; I have spent that time in minutes in the direct care 

of this critically ill patient, excluding procedure time.








ED Disposition


Clinical Impression: 


 Strain of knee, bilateral





Disposition: DC-01 TO HOME OR SELFCARE


Is pt being admited?: No


Does the pt Need Aspirin: No


Condition: Stable


Instructions:  Knee Pain (ED), RICE Therapy (ED), Ibuprofen (By mouth)


Additional Instructions: 


Follow-up with a orthopedic doctor in 3-5 days or if symptoms worsen and 

continue return to emergency room as soon as possible. 


Prescriptions: 


Ibuprofen [Motrin] 600 mg PO Q8H PRN #30 tablet


 PRN Reason: Pain


Referrals: 


PRIMARY CARE,MD [Primary Care Provider] - 3-5 Days


SJ DUNLAP MD [Staff Physician] - 3-5 Days


[facility] [Outside] - 3-5 Days


RAYA WALKER MD [Staff Physician] - 3-5 Days

## 2018-05-30 NOTE — XRAY REPORT
BILATERAL KNEES, 3 VIEWS



History: Bilateral knee pain.



Findings: Normal bone mineralization. Minimal osteoarthritic changes 

are identified in both knees which appear appropriate for this persons 

age. No evidence for is advanced joint pathology, fracture, bone lesion 

or joint effusion.



Impression: Minimal osteoarthritis.

## 2018-08-19 NOTE — EMERGENCY DEPARTMENT REPORT
- General


Chief Complaint: Upper Respiratory Infection


Stated Complaint: CHEST PAIN SOB


Time Seen by Provider: 08/19/18 14:36


Source: patient


Mode of arrival: Ambulatory


Limitations: No Limitations





- History of Present Illness


Initial Comments: 





57 yo male with hx of COPD.  Unable to afford home medications including 

albuterol.  Presents with shortness of breath, wheezing, Pain with cough.  +

productive cough


MD Complaint: cough


-: days(s) (5)


Severity: mild, moderate, severe


Consistency: constant


Improves With: nothing


Associated Symptoms: fever, chills, shortness of breath





- Related Data


 Previous Rx's











 Medication  Instructions  Recorded  Last Taken  Type


 


Ibuprofen [Motrin 600 MG tab] 600 mg PO Q8H PRN #20 tablet 07/23/17 Unknown Rx


 


predniSONE [Deltasone] 20 mg PO BID #10 tab 07/23/17 Unknown Rx


 


Lansoprazole [Prevacid] 15 mg PO BID #30 cap 09/04/17 Unknown Rx


 


traMADol [Ultram] 50 mg PO Q6HR PRN #14 tablet 09/04/17 Unknown Rx


 


Ondansetron [Zofran TAB] 4 mg PO Q8HR PRN #20 tablet 02/09/18 Unknown Rx


 


traMADol [Ultram] 50 mg PO Q6HR PRN #20 tablet 02/09/18 Unknown Rx


 


Ibuprofen [Motrin] 600 mg PO Q8H PRN #30 tablet 05/27/18 Unknown Rx


 


Cephalexin [Keflex] 500 mg PO QID 7 Days #28 capsule 08/19/18 Unknown Rx


 


predniSONE [Deltasone] 60 mg PO QDAY 5 Days #15 tab 08/19/18 Unknown Rx











 Allergies











Allergy/AdvReac Type Severity Reaction Status Date / Time


 


acetaminophen Allergy  Seizure Verified 08/19/18 13:07





[From Darvocet-N]     


 


aspirin Allergy  Itching Verified 08/19/18 13:07


 


cyclobenzaprine HCl Allergy  Itching Verified 08/19/18 13:07





[From Flexeril]     


 


propoxyphene napsylate Allergy  Seizure Verified 08/19/18 13:07





[From Darvocet-N]     














ED Review of Systems


ROS: 


Stated complaint: CHEST PAIN SOB


Other details as noted in HPI





Comment: All other systems reviewed and negative


Constitutional: denies: fever, malaise


Respiratory: cough, SOB at rest, wheezing


Cardiovascular: chest pain





ED Past Medical Hx





- Past Medical History


Hx Hypertension: Yes


Hx Arthritis: Yes (KNEES)


Hx COPD: Yes


Additional medical history: CHRONIC BACK PAIN, Abdominal aneurysm





- Social History


Smoking Status: Never Smoker


Substance Use Type: None





- Medications


Home Medications: 


 Home Medications











 Medication  Instructions  Recorded  Confirmed  Last Taken  Type


 


Ibuprofen [Motrin 600 MG tab] 600 mg PO Q8H PRN #20 tablet 07/23/17  Unknown Rx


 


predniSONE [Deltasone] 20 mg PO BID #10 tab 07/23/17  Unknown Rx


 


Lansoprazole [Prevacid] 15 mg PO BID #30 cap 09/04/17  Unknown Rx


 


traMADol [Ultram] 50 mg PO Q6HR PRN #14 tablet 09/04/17  Unknown Rx


 


Ondansetron [Zofran TAB] 4 mg PO Q8HR PRN #20 tablet 02/09/18  Unknown Rx


 


traMADol [Ultram] 50 mg PO Q6HR PRN #20 tablet 02/09/18  Unknown Rx


 


Ibuprofen [Motrin] 600 mg PO Q8H PRN #30 tablet 05/27/18  Unknown Rx


 


Cephalexin [Keflex] 500 mg PO QID 7 Days #28 capsule 08/19/18  Unknown Rx


 


predniSONE [Deltasone] 60 mg PO QDAY 5 Days #15 tab 08/19/18  Unknown Rx














ED Physical Exam





- General


Limitations: No Limitations


General appearance: alert, in no apparent distress, other (speaking full word 

sentences, appears comfortable)





- Head


Head exam: Present: atraumatic, normocephalic





- Eye


Eye exam: Present: normal appearance





- ENT


ENT exam: Present: mucous membranes moist





- Neck


Neck exam: Present: normal inspection.  Absent: tenderness, meningismus





- Respiratory


Respiratory exam: Present: wheezes, prolonged expiratory.  Absent: rales, 

rhonchi, stridor, chest wall tenderness, accessory muscle use, decreased breath 

sounds





- Cardiovascular


Cardiovascular Exam: Present: regular rate, normal rhythm.  Absent: systolic 

murmur, diastolic murmur, rubs, gallop





- GI/Abdominal


GI/Abdominal exam: Present: soft, normal bowel sounds.  Absent: distended, 

tenderness, guarding, rebound





- Rectal


Rectal exam: Present: deferred





- Extremities Exam


Extremities exam: Present: normal inspection





- Back Exam


Back exam: Present: normal inspection





- Neurological Exam


Neurological exam: Present: alert, oriented X3





- Psychiatric


Psychiatric exam: Present: normal affect, normal mood





- Skin


Skin exam: Present: warm, dry, intact, normal color.  Absent: rash





ED Course





 Vital Signs











  08/19/18





  13:07


 


Temperature 97.9 F


 


Pulse Rate 67


 


Respiratory 18





Rate 


 


Blood Pressure 143/83


 


O2 Sat by Pulse 97





Oximetry 














ED Medical Decision Making





- EKG Data


EKG shows normal: sinus rhythm, axis, intervals, QRS complexes


Rate: normal





- EKG Data


Interpretation: no acute changes, normal EKG





- Medical Decision Making





acute mild COPD exacerbation rx: prednisone, cephalexin


Critical care attestation.: 


If time is entered above; I have spent that time in minutes in the direct care 

of this critically ill patient, excluding procedure time.








ED Disposition


Clinical Impression: 


 Acute exacerbation of chronic obstructive pulmonary disease (COPD)





Disposition: DC-01 TO HOME OR SELFCARE


Is pt being admited?: No


Does the pt Need Aspirin: No


Condition: Stable


Instructions:  Chronic Obstructive Pulmonary Disease (ED)


Prescriptions: 


Cephalexin [Keflex] 500 mg PO QID 7 Days #28 capsule


predniSONE [Deltasone] 60 mg PO QDAY 5 Days #15 tab


Referrals: 


PRIMARY CARE,MD [Primary Care Provider] - 3-5 Days


Time of Disposition: 15:00

## 2018-09-03 NOTE — EMERGENCY DEPARTMENT REPORT
ED General Adult HPI





- General


Chief complaint: Abdominal Pain


Stated complaint: STOMACH PAIN


Time Seen by Provider: 09/03/18 07:16


Source: patient


Mode of arrival: Ambulatory


Limitations: No Limitations





- History of Present Illness


Initial comments: 


This is a 58-year-old male that reports to the emergency department with 

recurrent complaints.  He states he has no primary care doctor.  COPD.  It 

appears that he has been previously treated with Prevacid analgesia for this 

symptom complex before.  He said several negative CTs of the abdomen and 

pelvis.  The area in question appears to be the left costal area which he 

repeatedly rubs.  He does not describe his pain as chest pain.  There is no arm 

or neck or back radiation.  Is apparently tender to palpation.  He denies 

vomiting or nausea.  He's had no fever or chills.  He is not coughing.  The 

symptoms are really quite recurrent.  He states he has had some wheezing.  Seen 

this patient in the past for back and abdominal pain.  He does appear to have 

chronic pain.


-: days(s)


Location: chest, abdomen


Radiation: non-radiation


Quality: aching


Consistency: constant


Improves with: none


Worsens with: other (movement)


Associated Symptoms: denies other symptoms


Treatments Prior to Arrival: none





- Related Data


 Previous Rx's











 Medication  Instructions  Recorded  Last Taken  Type


 


Ibuprofen [Motrin 600 MG tab] 600 mg PO Q8H PRN #20 tablet 07/23/17 Unknown Rx


 


predniSONE [Deltasone] 20 mg PO BID #10 tab 07/23/17 Unknown Rx


 


Ondansetron [Zofran TAB] 4 mg PO Q8HR PRN #20 tablet 02/09/18 Unknown Rx


 


traMADol [Ultram] 50 mg PO Q6HR PRN #20 tablet 02/09/18 Unknown Rx


 


Ibuprofen [Motrin] 600 mg PO Q8H PRN #30 tablet 05/27/18 Unknown Rx


 


Cephalexin [Keflex] 500 mg PO QID 7 Days #28 capsule 08/19/18 Unknown Rx


 


Albuterol Sulfate [Ventolin HFA] 2 puff IH Q4H PRN #1 hfa.aer.ad 09/03/18 

Unknown Rx


 


Lansoprazole [Prevacid] 15 mg PO BID #30 cap 09/03/18 Unknown Rx


 


predniSONE [Deltasone] 60 mg PO QDAY 5 Days #15 tab 09/03/18 Unknown Rx


 


traMADol [Ultram 50 MG tab] 50 mg PO Q6HR PRN #14 tablet 09/03/18 Unknown Rx











 Allergies











Allergy/AdvReac Type Severity Reaction Status Date / Time


 


acetaminophen Allergy  Seizure Verified 08/19/18 13:07





[From Darvocet-N]     


 


aspirin Allergy  Itching Verified 08/19/18 13:07


 


cyclobenzaprine HCl Allergy  Itching Verified 08/19/18 13:07





[From Flexeril]     


 


propoxyphene napsylate Allergy  Seizure Verified 08/19/18 13:07





[From Darvocet-N]     














ED Review of Systems


ROS: 


Stated complaint: STOMACH PAIN


Other details as noted in HPI





Constitutional: denies: chills, fever


Eyes: denies: eye pain, eye discharge, vision change


ENT: denies: ear pain, throat pain


Respiratory: denies: cough, shortness of breath, wheezing


Cardiovascular: denies: chest pain, palpitations


Endocrine: no symptoms reported


Gastrointestinal: as per HPI, abdominal pain.  denies: nausea, diarrhea


Genitourinary: denies: urgency, dysuria


Musculoskeletal: denies: back pain, joint swelling, arthralgia


Skin: denies: rash, lesions


Neurological: denies: headache, weakness, paresthesias


Psychiatric: denies: anxiety, depression


Hematological/Lymphatic: denies: easy bleeding, easy bruising





ED Past Medical Hx





- Past Medical History


Hx Hypertension: Yes


Hx Arthritis: Yes (KNEES)


Hx COPD: Yes


Additional medical history: CHRONIC BACK PAIN, Abdominal aneurysm, hep C+





- Social History


Smoking Status: Former Smoker


Substance Use Type: Alcohol





- Medications


Home Medications: 


 Home Medications











 Medication  Instructions  Recorded  Confirmed  Last Taken  Type


 


Ibuprofen [Motrin 600 MG tab] 600 mg PO Q8H PRN #20 tablet 07/23/17  Unknown Rx


 


predniSONE [Deltasone] 20 mg PO BID #10 tab 07/23/17  Unknown Rx


 


Ondansetron [Zofran TAB] 4 mg PO Q8HR PRN #20 tablet 02/09/18  Unknown Rx


 


traMADol [Ultram] 50 mg PO Q6HR PRN #20 tablet 02/09/18  Unknown Rx


 


Ibuprofen [Motrin] 600 mg PO Q8H PRN #30 tablet 05/27/18  Unknown Rx


 


Cephalexin [Keflex] 500 mg PO QID 7 Days #28 capsule 08/19/18  Unknown Rx


 


Albuterol Sulfate [Ventolin HFA] 2 puff IH Q4H PRN #1 hfa.aer.ad 09/03/18  

Unknown Rx


 


Lansoprazole [Prevacid] 15 mg PO BID #30 cap 09/03/18  Unknown Rx


 


predniSONE [Deltasone] 60 mg PO QDAY 5 Days #15 tab 09/03/18  Unknown Rx


 


traMADol [Ultram 50 MG tab] 50 mg PO Q6HR PRN #14 tablet 09/03/18  Unknown Rx














ED Physical Exam





- General


Limitations: No Limitations


General appearance: alert, in no apparent distress





- Head


Head exam: Present: atraumatic, normocephalic





- Eye


Eye exam: Present: normal appearance, PERRL, EOMI.  Absent: scleral icterus





- ENT


ENT exam: Present: mucous membranes moist





- Neck


Neck exam: Present: normal inspection.  Absent: tenderness, meningismus





- Respiratory


Respiratory exam: Present: normal lung sounds bilaterally, chest wall 

tenderness.  Absent: respiratory distress





- Cardiovascular


Cardiovascular Exam: Present: regular rate, normal rhythm.  Absent: systolic 

murmur, diastolic murmur, rubs, gallop





- GI/Abdominal


GI/Abdominal exam: Present: soft, normal bowel sounds.  Absent: distended, 

tenderness, guarding, rebound, rigid





- Rectal


Rectal exam: Present: deferred





- Extremities Exam


Extremities exam: Present: normal inspection, normal capillary refill.  Absent: 

tenderness, pedal edema, joint swelling, calf tenderness





- Back Exam


Back exam: Present: normal inspection.  Absent: CVA tenderness (R), CVA 

tenderness (L), muscle spasm, paraspinal tenderness, vertebral tenderness





- Neurological Exam


Neurological exam: Present: alert, oriented X3, CN II-XII intact.  Absent: 

motor sensory deficit





- Psychiatric


Psychiatric exam: Present: normal affect, normal mood





- Skin


Skin exam: Present: warm, dry, intact, normal color.  Absent: rash





ED Course


 Vital Signs











  09/03/18 09/03/18 09/03/18





  06:13 07:08 08:07


 


Temperature 97.9 F 97.9 F 97.7 F


 


Pulse Rate 66 70 59 L


 


Respiratory 18 24 24





Rate   


 


Blood Pressure 148/124 148/120 146/106


 


O2 Sat by Pulse 97 97 100





Oximetry   














  09/03/18 09/03/18 09/03/18





  09:48 10:01 10:15


 


Temperature   


 


Pulse Rate 62 74 54 L


 


Respiratory 21 11 L 9 L





Rate   


 


Blood Pressure  162/97 141/111


 


O2 Sat by Pulse  97 98





Oximetry   














  09/03/18 09/03/18 09/03/18





  10:31 10:45 11:01


 


Temperature   


 


Pulse Rate 61 67 61


 


Respiratory 11 L 7 L 22





Rate   


 


Blood Pressure 156/100 137/69 152/58


 


O2 Sat by Pulse 97 98 94





Oximetry   














  09/03/18





  11:15


 


Temperature 


 


Pulse Rate 60


 


Respiratory 22





Rate 


 


Blood Pressure 152/58


 


O2 Sat by Pulse 96





Oximetry 














- Reevaluation(s)


Reevaluation #1: 


Patient is given analgesia and an appendectomy.  His wheezing did improve.  He 

is appropriate for outpatient referral.


09/03/18 12:57








ED Medical Decision Making





- Lab Data


Result diagrams: 


 09/03/18 09:58





 09/03/18 09:58








 Laboratory Results - last 24 hr











  09/03/18 09/03/18 09/03/18





  09:58 09:58 09:58


 


WBC  6.7  


 


RBC  5.11 H  


 


Hgb  15.8 H  


 


Hct  46.9 H  


 


MCV  92  


 


MCH  31  


 


MCHC  34  


 


RDW  13.2  


 


Plt Count  212  


 


Lymph % (Auto)  22.9  


 


Mono % (Auto)  4.0  


 


Eos % (Auto)  2.6  


 


Baso % (Auto)  0.8  


 


Lymph #  1.5  


 


Mono #  0.3  


 


Eos #  0.2  


 


Baso #  0.1  


 


Seg Neutrophils %  69.7  


 


Seg Neutrophils #  4.7  


 


PT    13.1


 


INR    0.94


 


APTT    26.1


 


Sodium   138 


 


Potassium   4.5 


 


Chloride   100.8 


 


Carbon Dioxide   20 L 


 


Anion Gap   22 


 


BUN   10 


 


Creatinine   0.7 L 


 


Estimated GFR   > 60 


 


BUN/Creatinine Ratio   14 


 


Glucose   137 H 


 


Calcium   9.2 


 


Total Bilirubin   0.70 


 


Direct Bilirubin   < 0.2 


 


AST   40 


 


ALT   46 


 


Alkaline Phosphatase   67 


 


Ammonia   


 


Total Creatine Kinase   


 


CK-MB (CK-2)   


 


CK-MB (CK-2) Rel Index   


 


Troponin T   


 


NT-Pro-B Natriuret Pep   


 


Total Protein   6.7 


 


Albumin   4.4 


 


Albumin/Globulin Ratio   1.9 


 


Urine Color   


 


Urine Turbidity   


 


Urine pH   


 


Ur Specific Gravity   


 


Urine Protein   


 


Urine Glucose (UA)   


 


Urine Ketones   


 


Urine Blood   


 


Urine Nitrite   


 


Urine Bilirubin   


 


Urine Urobilinogen   


 


Ur Leukocyte Esterase   


 


Urine WBC (Auto)   


 


Urine RBC (Auto)   


 


Blood Type   


 


Antibody Screen   














  09/03/18 09/03/18 09/03/18





  09:58 09:58 09:58


 


WBC   


 


RBC   


 


Hgb   


 


Hct   


 


MCV   


 


MCH   


 


MCHC   


 


RDW   


 


Plt Count   


 


Lymph % (Auto)   


 


Mono % (Auto)   


 


Eos % (Auto)   


 


Baso % (Auto)   


 


Lymph #   


 


Mono #   


 


Eos #   


 


Baso #   


 


Seg Neutrophils %   


 


Seg Neutrophils #   


 


PT   


 


INR   


 


APTT   


 


Sodium   


 


Potassium   


 


Chloride   


 


Carbon Dioxide   


 


Anion Gap   


 


BUN   


 


Creatinine   


 


Estimated GFR   


 


BUN/Creatinine Ratio   


 


Glucose   


 


Calcium   


 


Total Bilirubin   


 


Direct Bilirubin   


 


AST   


 


ALT   


 


Alkaline Phosphatase   


 


Ammonia  48.0  


 


Total Creatine Kinase   62 


 


CK-MB (CK-2)   2.0 


 


CK-MB (CK-2) Rel Index   3.2 


 


Troponin T   < 0.010 


 


NT-Pro-B Natriuret Pep   121.9 


 


Total Protein   


 


Albumin   


 


Albumin/Globulin Ratio   


 


Urine Color   


 


Urine Turbidity   


 


Urine pH   


 


Ur Specific Gravity   


 


Urine Protein   


 


Urine Glucose (UA)   


 


Urine Ketones   


 


Urine Blood   


 


Urine Nitrite   


 


Urine Bilirubin   


 


Urine Urobilinogen   


 


Ur Leukocyte Esterase   


 


Urine WBC (Auto)   


 


Urine RBC (Auto)   


 


Blood Type    A POSITIVE


 


Antibody Screen    Negative














  09/03/18





  11:45


 


WBC 


 


RBC 


 


Hgb 


 


Hct 


 


MCV 


 


MCH 


 


MCHC 


 


RDW 


 


Plt Count 


 


Lymph % (Auto) 


 


Mono % (Auto) 


 


Eos % (Auto) 


 


Baso % (Auto) 


 


Lymph # 


 


Mono # 


 


Eos # 


 


Baso # 


 


Seg Neutrophils % 


 


Seg Neutrophils # 


 


PT 


 


INR 


 


APTT 


 


Sodium 


 


Potassium 


 


Chloride 


 


Carbon Dioxide 


 


Anion Gap 


 


BUN 


 


Creatinine 


 


Estimated GFR 


 


BUN/Creatinine Ratio 


 


Glucose 


 


Calcium 


 


Total Bilirubin 


 


Direct Bilirubin 


 


AST 


 


ALT 


 


Alkaline Phosphatase 


 


Ammonia 


 


Total Creatine Kinase 


 


CK-MB (CK-2) 


 


CK-MB (CK-2) Rel Index 


 


Troponin T 


 


NT-Pro-B Natriuret Pep 


 


Total Protein 


 


Albumin 


 


Albumin/Globulin Ratio 


 


Urine Color  Yellow


 


Urine Turbidity  Clear


 


Urine pH  8.0 H


 


Ur Specific Gravity  1.010


 


Urine Protein  <15 mg/dl


 


Urine Glucose (UA)  Neg


 


Urine Ketones  Neg


 


Urine Blood  Neg


 


Urine Nitrite  Neg


 


Urine Bilirubin  Neg


 


Urine Urobilinogen  < 2.0


 


Ur Leukocyte Esterase  Neg


 


Urine WBC (Auto)  < 1.0


 


Urine RBC (Auto)  1.0


 


Blood Type 


 


Antibody Screen 














- EKG Data


-: EKG Interpreted by Me


EKG shows normal: sinus rhythm, axis, intervals, QRS complexes, ST-T waves


Rate: normal





- EKG Data


Interpretation: no acute changes, other (low-voltage)





- Radiology Data


Radiology results: report reviewed


interpreted by me: 


No acute findings


Critical care attestation.: 


If time is entered above; I have spent that time in minutes in the direct care 

of this critically ill patient, excluding procedure time.








ED Disposition


Clinical Impression: 


 Chest wall pain, COPD exacerbation





Disposition: DC-01 TO HOME OR SELFCARE


Is pt being admited?: No


Does the pt Need Aspirin: No


Condition: Stable


Instructions:  Chest Pain (ED), Chronic Obstructive Pulmonary Disease (ED)


Additional Instructions: 


Follow-up with primary care is strongly recommended.  See referral.  Return to 

the emergency department for any acute change or problem.  Long-term problems 

can best be addressed in the primary care setting.  Rx for pain, a steroid for 

your COPD and an inhaler.


Prescriptions: 


Albuterol Sulfate [Ventolin HFA] 2 puff IH Q4H PRN #1 hfa.aer.ad


 PRN Reason: Shortness Of Breath


Lansoprazole [Prevacid] 15 mg PO BID #30 cap


predniSONE [Deltasone] 60 mg PO QDAY 5 Days #15 tab


traMADol [Ultram 50 MG tab] 50 mg PO Q6HR PRN #14 tablet


 PRN Reason: Pain


Referrals: 


[facility] [Provider Group] - 2-3 Days


Time of Disposition: 12:59

## 2018-09-03 NOTE — CAT SCAN REPORT
CT ABDOMEN PELVIS WITHOUT CONTRAST:



HISTORY:  abdominal pain.



COMPARISON: 2/9/18.



TECHNIQUE:  Helical CT in 1.25mm intervals without IV contrast. 

Sagittal and coronal reconstructions. 





FINDINGS:



Lung bases: Normal.



Liver: Normal.



Biliary system: Normal.



Pancreas: Normal.



Spleen: Normal.



Kidneys/ureters/bladder: Normal.



Adrenal glands: Normal.



Aorta: Mild to moderate atherosclerotic disease is noted the abdominal 

aorta. There is minimal infrarenal aneurysmal dilatation measuring a 

maximum of 2.6 cm.



Intestines: Normal.



Appendix: Normal.



Ascites: None.



Adenopathy: None.



Musculoskeletal: Moderate thoracolumbar spondylosis. No fracture or 

suspicious bony lesion.





IMPRESSION:

No acute abdominal process identified.

## 2018-09-03 NOTE — XRAY REPORT
ROUTINE CHEST, TWO VIEWS:



HISTORY:  Short of breath, COPD.



The trachea, heart, mediastinal contour, lung fields and bony thorax 

are unremarkable. No significant change since 2/9/18.



IMPRESSION:

Unremarkable chest x-ray.

## 2020-01-19 ENCOUNTER — HOSPITAL ENCOUNTER (EMERGENCY)
Dept: HOSPITAL 5 - ED | Age: 60
Discharge: HOME | End: 2020-01-19
Payer: MEDICARE

## 2020-01-19 VITALS — SYSTOLIC BLOOD PRESSURE: 178 MMHG | DIASTOLIC BLOOD PRESSURE: 94 MMHG

## 2020-01-19 DIAGNOSIS — F17.200: ICD-10-CM

## 2020-01-19 DIAGNOSIS — K21.9: ICD-10-CM

## 2020-01-19 DIAGNOSIS — I10: ICD-10-CM

## 2020-01-19 DIAGNOSIS — J44.9: ICD-10-CM

## 2020-01-19 DIAGNOSIS — M54.5: Primary | ICD-10-CM

## 2020-01-19 DIAGNOSIS — M17.0: ICD-10-CM

## 2020-01-19 LAB
BILIRUB UR QL STRIP: (no result)
BLOOD UR QL VISUAL: (no result)
MUCOUS THREADS #/AREA URNS HPF: (no result) /HPF
PH UR STRIP: 5 [PH] (ref 5–7)
PROT UR STRIP-MCNC: (no result) MG/DL
RBC #/AREA URNS HPF: 2 /HPF (ref 0–6)
UROBILINOGEN UR-MCNC: 2 MG/DL (ref ?–2)
WBC #/AREA URNS HPF: < 1 /HPF (ref 0–6)

## 2020-01-19 PROCEDURE — 72100 X-RAY EXAM L-S SPINE 2/3 VWS: CPT

## 2020-01-19 PROCEDURE — 81001 URINALYSIS AUTO W/SCOPE: CPT

## 2020-01-19 PROCEDURE — 99283 EMERGENCY DEPT VISIT LOW MDM: CPT

## 2020-01-19 NOTE — EMERGENCY DEPARTMENT REPORT
ED Back Pain/Injury HPI





- General


Chief Complaint: Back Pain/Injury


Stated Complaint: BACK PAIN


Time Seen by Provider: 01/19/20 09:02


Source: patient


Limitations: Physical Limitation





- History of Present Illness


Initial Comments: 





This is a 59-year-old male complaining of mid lower back 4 days no recent falls

or injuries.  He is also complaining of urinary frequency.  Patient does report 

a long history of chronic back pain.  Patient denies abdominal pain, no bowel or

bladder incontinence, no freqent urination, fever chills chest pain and 

shortness of breath.


-: days(s) (4)


Quality: aching


Consistency: constant


Improves With: none


Worsens With: movement


Associated Symptoms: denies: confusion, weakness, chest pain, numbness, cough, 

fever/chills, constipation, abdominal pain, nausea/vomiting, rash, seizure, 

shortness of breath





- Related Data


                                  Previous Rx's











 Medication  Instructions  Recorded  Last Taken  Type


 


Ibuprofen [Motrin 600 MG tab] 600 mg PO Q8H PRN #20 tablet 07/23/17 Unknown Rx


 


predniSONE [Deltasone] 20 mg PO BID #10 tab 07/23/17 Unknown Rx


 


Ondansetron [Zofran TAB] 4 mg PO Q8HR PRN #20 tablet 02/09/18 Unknown Rx


 


traMADoL [Ultram] 50 mg PO Q6HR PRN #20 tablet 02/09/18 Unknown Rx


 


Ibuprofen [Motrin] 600 mg PO Q8H PRN #30 tablet 05/27/18 Unknown Rx


 


Cephalexin [Keflex] 500 mg PO QID 7 Days #28 capsule 08/19/18 Unknown Rx


 


Albuterol Sulfate [Ventolin HFA] 2 puff IH Q4H PRN #1 hfa.aer.ad 09/03/18 

Unknown Rx


 


predniSONE [Deltasone] 60 mg PO QDAY 5 Days #15 tab 09/03/18 Unknown Rx


 


traMADoL [Ultram 50 MG tab] 50 mg PO Q6HR PRN #14 tablet 09/03/18 Unknown Rx


 


Ciprofloxacin HCl [Ciprofloxacin 500 mg PO Q12H #12 tab 12/29/18 Unknown Rx





TAB]    


 


Ipratropium [Atrovent NEB] 0.5 mg IH Q8HRT #1 box 12/29/18 Unknown Rx


 


Nebulizer and Compressor [[place] 1 each MC TID PRN #1 each 12/29/18 Unknown Rx





Choice Nebulizer]    


 


Prednisone [predniSONE 10 mg 10 mg PO .TAPER #1 tab.ds.pk 12/29/18 Unknown Rx





(6-Day Pack, 21 Tabs)]    


 


Albuterol INH(or & Nicu Only) 2 puff IH QID PRN #1 inhalation 07/27/19 Unknown 

Rx





[ProAir HFA Inhaler]    


 


Albuterol Sulfate [Albuterol 0.63% 0.63 mg IH TID PRN #1 box 07/27/19 Unknown Rx





NEBS]    


 


HYDROcodone/APAP 5-325 [[place] 1 each PO Q6HR PRN #20 tablet 07/27/19 Unknown Rx





5/325]    


 


Lansoprazole [Prevacid] 15 mg PO BID #30 cap 07/27/19 Unknown Rx


 


Lisinopril/Hydrochlorothiazide 1 tab PO QDAY #30 tab 07/27/19 Unknown Rx





[Zestoretic 20-25 mg]    


 


Ondansetron [Zofran Odt] 4 mg PO Q8HR PRN #20 tab.rapdis 07/27/19 Unknown Rx


 


traMADoL [Ultram] 50 mg PO Q6HR PRN #20 tablet 01/19/20 Unknown Rx











                                    Allergies











Allergy/AdvReac Type Severity Reaction Status Date / Time


 


acetaminophen Allergy  Seizure Verified 08/19/18 13:07





[From Darvocet-N]     


 


aspirin Allergy  Itching Verified 08/19/18 13:07


 


cyclobenzaprine HCl Allergy  Itching Verified 08/19/18 13:07





[From Flexeril]     


 


propoxyphene napsylate Allergy  Seizure Verified 08/19/18 13:07





[From Darvocet-N]     














ED Review of Systems


ROS: 


Stated complaint: BACK PAIN


Other details as noted in HPI





Comment: All other systems reviewed and negative


Constitutional: no symptoms reported


ENT: denies: ear pain, throat pain


Respiratory: no symptoms reported


Cardiovascular: denies: chest pain, palpitations


Gastrointestinal: denies: abdominal pain, nausea, vomiting


Musculoskeletal: back pain (mid low back pain )


Skin: as per HPI.  denies: rash, lesions


Neurological: denies: headache, weakness





ED Past Medical Hx





- Past Medical History


Previous Medical History?: Yes


Hx Hypertension: Yes


Hx GERD: Yes


Hx Liver Disease: Yes (hep C)


Hx Arthritis: Yes (KNEES)


Hx COPD: Yes (no home O2)


Additional medical history: CHRONIC BACK PAIN, Abdominal aneurysm (not seen on 

ct 7/27/19), DDD, sciatica, deviated septum





- Surgical History


Past Surgical History?: Yes


Additional Surgical History: left ankle.  nose surgery x 3





- Social History


Smoking Status: Light Tobacco Smoker





- Medications


Home Medications: 


                                Home Medications











 Medication  Instructions  Recorded  Confirmed  Last Taken  Type


 


Ibuprofen [Motrin 600 MG tab] 600 mg PO Q8H PRN #20 tablet 07/23/17  Unknown Rx


 


predniSONE [Deltasone] 20 mg PO BID #10 tab 07/23/17  Unknown Rx


 


Ondansetron [Zofran TAB] 4 mg PO Q8HR PRN #20 tablet 02/09/18  Unknown Rx


 


traMADoL [Ultram] 50 mg PO Q6HR PRN #20 tablet 02/09/18  Unknown Rx


 


Ibuprofen [Motrin] 600 mg PO Q8H PRN #30 tablet 05/27/18  Unknown Rx


 


Cephalexin [Keflex] 500 mg PO QID 7 Days #28 capsule 08/19/18  Unknown Rx


 


Albuterol Sulfate [Ventolin HFA] 2 puff IH Q4H PRN #1 hfa.aer.ad 09/03/18  

Unknown Rx


 


predniSONE [Deltasone] 60 mg PO QDAY 5 Days #15 tab 09/03/18  Unknown Rx


 


traMADoL [Ultram 50 MG tab] 50 mg PO Q6HR PRN #14 tablet 09/03/18  Unknown Rx


 


Ciprofloxacin HCl [Ciprofloxacin 500 mg PO Q12H #12 tab 12/29/18  Unknown Rx





TAB]     


 


Ipratropium [Atrovent NEB] 0.5 mg IH Q8HRT #1 box 12/29/18  Unknown Rx


 


Nebulizer and Compressor [[place] 1 each MC TID PRN #1 each 12/29/18  Unknown Rx





Choice Nebulizer]     


 


Prednisone [predniSONE 10 mg 10 mg PO .TAPER #1 tab.ds.pk 12/29/18  Unknown Rx





(6-Day Pack, 21 Tabs)]     


 


Albuterol INH(or & Nicu Only) 2 puff IH QID PRN #1 inhalation 07/27/19  Unknown 

Rx





[ProAir HFA Inhaler]     


 


Albuterol Sulfate [Albuterol 0.63% 0.63 mg IH TID PRN #1 box 07/27/19  Unknown 

Rx





NEBS]     


 


HYDROcodone/APAP 5-325 [[place] 1 each PO Q6HR PRN #20 tablet 07/27/19  Unknown Rx





5/325]     


 


Lansoprazole [Prevacid] 15 mg PO BID #30 cap 07/27/19  Unknown Rx


 


Lisinopril/Hydrochlorothiazide 1 tab PO QDAY #30 tab 07/27/19  Unknown Rx





[Zestoretic 20-25 mg]     


 


Ondansetron [Zofran Odt] 4 mg PO Q8HR PRN #20 tab.rapdis 07/27/19  Unknown Rx


 


traMADoL [Ultram] 50 mg PO Q6HR PRN #20 tablet 01/19/20  Unknown Rx














ED Physical Exam





- General


Limitations: Physical Limitation


General appearance: alert, in no apparent distress





- Head


Head exam: Present: atraumatic





- Eye


Eye exam: Present: normal appearance





- ENT


ENT exam: Present: normal exam





- Neck


Neck exam: Present: normal inspection





- Respiratory


Respiratory exam: Present: normal lung sounds bilaterally.  Absent: respiratory 

distress, wheezes, rales, rhonchi





- Cardiovascular


Cardiovascular Exam: Present: regular rate, normal heart sounds





- GI/Abdominal


GI/Abdominal exam: Present: soft, other (large abdmoninal girth)





- Back Exam


Back exam: Present: normal inspection, tenderness (mid lower back spinal 

tenderness. ambulatory with cane), vertebral tenderness.  Absent: CVA tenderness

 (R), CVA tenderness (L), muscle spasm





- Neurological Exam


Neurological exam: Present: alert, oriented X3





- Psychiatric


Psychiatric exam: Present: normal affect





- Skin


Skin exam: Present: warm, dry, intact, normal color.  Absent: rash





ED Course


                                   Vital Signs











  01/19/20





  06:50


 


Temperature 97.9 F


 


Pulse Rate 72


 


Respiratory 22





Rate 


 


Blood Pressure 178/94


 


O2 Sat by Pulse 96





Oximetry 














ED Medical Decision Making





- Radiology Data


Radiology results: report reviewed


interpreted by me: 





XRAY L/S


FINDINGS: The vertebral body heights and disc spaces are preserved. No fracture 

or 


 spondylolisthesis. No significant spurring or arthritis. No bony abnormality 

identified. There is 


 moderate vascular calcification with slight ectasia of the infrarenal aorta. 





 Impression: Negative lumbar spine radiograph





- Medical Decision Making





59-year-old male with a history of chronic back pain.  Today he comes in 

complaining of lower back pain on exam he was positive for spinal tenderness.  

X-ray of the lumbar shows no acute findings.  Urine done.  He is negative for 

WBCs and RBCs.  Patient states he is out of medications for his back pain.  

Patient will be discharged home with Ultram .  He's taken Ultram in the past 

with no side effects.  To follow-up with his primary care doctor.


Critical Care Time: No


Critical care attestation.: 


If time is entered above; I have spent that time in minutes in the direct care 

of this critically ill patient, excluding procedure time.








ED Disposition


Clinical Impression: 


Back pain


Qualifiers:


 Back pain location: low back pain Chronicity: chronic Back pain laterality: 

midline Sciatica presence: without sciatica Qualified Code(s): M54.5 - Low back 

pain





Disposition: DC-01 TO HOME OR SELFCARE


Is pt being admited?: No


Does the pt Need Aspirin: No


Condition: Stable


Instructions:  Back Pain (ED)


Additional Instructions: 


Rest, warm compress, gently stretching to for lower back . Follow up with your 

doctor in 3-5 days or sooner for any worsening symptoms


Prescriptions: 


traMADoL [Ultram] 50 mg PO Q6HR PRN #20 tablet


 PRN Reason: Pain


Referrals: 


RAUDEL MONTOYA MD [Primary Care Provider] - 3-5 Days


Time of Disposition: 10:31

## 2020-01-19 NOTE — XRAY REPORT
Lumbosacral spine, 3 views



INDICATION: Low back pain



FINDINGS: The vertebral body heights and disc spaces are preserved. No fracture or spondylolisthesis.
 No significant spurring or arthritis. No bony abnormality identified. There is moderate vascular elsie
cification with slight ectasia of the infrarenal aorta.



Impression: Negative lumbar spine radiograph. 



Signer Name: Austyn Yan MD 

Signed: 1/19/2020 9:43 AM

 Workstation Name: [id number]